# Patient Record
(demographics unavailable — no encounter records)

---

## 2025-05-24 NOTE — HISTORY OF PRESENT ILLNESS
[FreeTextEntry1] : Patient presents for annual well woman encounter There are no current GYN complaints She is status post left mastectomy at the age of 35 Patient has been offered BRCA testing but declines She states that her daughter is BRCA negative There is a past history of ear surgery Current medications are hydrochlorothiazide, simvastatin, vitamin D3 and vitamin B12 There are no medication allergies although at one point in the past she thought she might be allergic to penicillin Social history is unremarkable

## 2025-05-24 NOTE — DISCUSSION/SUMMARY
[FreeTextEntry1] : Annual well woman encounter Status post left mastectomy for breast cancer at age 35 Patient declines BRCA testing She is given a referral for her mammogram if which is due in December Her last colonoscopy was approximately 2008.  Patient declines any further colonoscopy Regular physical activity and weightbearing exercises are encouraged Follow-up 1 year

## 2025-05-24 NOTE — PHYSICAL EXAM
[Chaperoned Physical Exam] : A chaperone was present in the examining room during all aspects of the physical examination. [MA] : MA [FreeTextEntry2] : Gina Crockett [Appropriately responsive] : appropriately responsive [Alert] : alert [No Acute Distress] : no acute distress [No Lymphadenopathy] : no lymphadenopathy [Soft] : soft [Non-tender] : non-tender [Non-distended] : non-distended [No HSM] : No HSM [No Lesions] : no lesions [No Mass] : no mass [Labia Majora] : normal [Labia Minora] : normal [Atrophy] : atrophy [Normal] : normal [Uterine Adnexae] : non-palpable [FreeTextEntry6] : There are no palpably enlarged pelvic masses [FreeTextEntry9] : Rectal is normal and guaiac is negative